# Patient Record
Sex: FEMALE | Race: WHITE | NOT HISPANIC OR LATINO
[De-identification: names, ages, dates, MRNs, and addresses within clinical notes are randomized per-mention and may not be internally consistent; named-entity substitution may affect disease eponyms.]

---

## 2017-12-19 ENCOUNTER — APPOINTMENT (OUTPATIENT)
Dept: DERMATOLOGY | Facility: CLINIC | Age: 52
End: 2017-12-19

## 2018-07-27 ENCOUNTER — APPOINTMENT (OUTPATIENT)
Dept: DERMATOLOGY | Facility: CLINIC | Age: 53
End: 2018-07-27
Payer: COMMERCIAL

## 2018-07-27 PROCEDURE — 99203 OFFICE O/P NEW LOW 30 MIN: CPT

## 2019-10-24 ENCOUNTER — RESULT REVIEW (OUTPATIENT)
Age: 54
End: 2019-10-24

## 2019-10-25 ENCOUNTER — APPOINTMENT (OUTPATIENT)
Dept: DERMATOLOGY | Facility: CLINIC | Age: 54
End: 2019-10-25
Payer: COMMERCIAL

## 2019-10-25 PROCEDURE — 99214 OFFICE O/P EST MOD 30 MIN: CPT | Mod: 25

## 2019-10-25 PROCEDURE — 11102 TANGNTL BX SKIN SINGLE LES: CPT

## 2019-11-12 ENCOUNTER — APPOINTMENT (OUTPATIENT)
Dept: SURGICAL ONCOLOGY | Facility: CLINIC | Age: 54
End: 2019-11-12
Payer: COMMERCIAL

## 2019-11-12 VITALS
HEART RATE: 70 BPM | OXYGEN SATURATION: 100 % | HEIGHT: 65 IN | WEIGHT: 152 LBS | DIASTOLIC BLOOD PRESSURE: 80 MMHG | BODY MASS INDEX: 25.33 KG/M2 | SYSTOLIC BLOOD PRESSURE: 134 MMHG

## 2019-11-12 DIAGNOSIS — Z92.21 PERSONAL HISTORY OF ANTINEOPLASTIC CHEMOTHERAPY: ICD-10-CM

## 2019-11-12 DIAGNOSIS — Z87.898 PERSONAL HISTORY OF OTHER SPECIFIED CONDITIONS: ICD-10-CM

## 2019-11-12 DIAGNOSIS — Z86.39 PERSONAL HISTORY OF OTHER ENDOCRINE, NUTRITIONAL AND METABOLIC DISEASE: ICD-10-CM

## 2019-11-12 DIAGNOSIS — Z13.71 ENCOUNTER FOR NONPROCREATIVE SCREENING FOR GENETIC DISEASE CARRIER STATUS: ICD-10-CM

## 2019-11-12 DIAGNOSIS — Z87.01 PERSONAL HISTORY OF PNEUMONIA (RECURRENT): ICD-10-CM

## 2019-11-12 DIAGNOSIS — Z78.9 OTHER SPECIFIED HEALTH STATUS: ICD-10-CM

## 2019-11-12 DIAGNOSIS — Z85.6 PERSONAL HISTORY OF LEUKEMIA: ICD-10-CM

## 2019-11-12 DIAGNOSIS — Z94.84 STEM CELLS TRANSPLANT STATUS: ICD-10-CM

## 2019-11-12 DIAGNOSIS — Z80.3 FAMILY HISTORY OF MALIGNANT NEOPLASM OF BREAST: ICD-10-CM

## 2019-11-12 DIAGNOSIS — Z85.3 PERSONAL HISTORY OF MALIGNANT NEOPLASM OF BREAST: ICD-10-CM

## 2019-11-12 PROCEDURE — 99244 OFF/OP CNSLTJ NEW/EST MOD 40: CPT

## 2019-11-12 RX ORDER — FOLIC ACID 1 MG/1
1 TABLET ORAL
Refills: 0 | Status: ACTIVE | COMMUNITY

## 2019-11-12 RX ORDER — ROSUVASTATIN CALCIUM 5 MG/1
5 TABLET, FILM COATED ORAL
Refills: 0 | Status: ACTIVE | COMMUNITY

## 2019-11-12 RX ORDER — LETROZOLE TABLETS 2.5 MG/1
2.5 TABLET, FILM COATED ORAL
Refills: 0 | Status: ACTIVE | COMMUNITY

## 2019-11-12 RX ORDER — ACYCLOVIR 400 MG/1
400 TABLET ORAL
Refills: 0 | Status: ACTIVE | COMMUNITY

## 2019-11-12 RX ORDER — LEVOTHYROXINE SODIUM 100 MCG
100 TABLET ORAL
Refills: 0 | Status: ACTIVE | COMMUNITY

## 2019-11-13 NOTE — REVIEW OF SYSTEMS
[Patient Intake Form Reviewed] : Patient intake form was reviewed [Negative] : Heme/Lymph [de-identified] : see HPI

## 2019-11-13 NOTE — PHYSICAL EXAM
[Normal] : supple, no neck mass and thyroid not enlarged [Normal Supraclavicular Lymph Nodes] : normal supraclavicular lymph nodes [Normal Neck Lymph Nodes] : normal neck lymph nodes  [Normal Axillary Lymph Nodes] : normal axillary lymph nodes [Normal] : grossly intact [de-identified] : healing right upper arm shave biopsy site

## 2019-11-13 NOTE — CONSULT LETTER
[Consult Letter:] : I had the pleasure of evaluating your patient, [unfilled]. [Dear  ___] : Dear  [unfilled], [Consult Closing:] : Thank you very much for allowing me to participate in the care of this patient.  If you have any questions, please do not hesitate to contact me. [Please see my note below.] : Please see my note below. [Sincerely,] : Sincerely, [FreeTextEntry3] : Pa Funk MD, MPH, FACS\par Surgical Oncology\par [facility] [place]\par Associate Professor of Surgery\par Department of General Surgery\par [employer] [facility]  [DrJohanny  ___] : Dr. ORTIZ

## 2019-11-13 NOTE — ASSESSMENT
[FreeTextEntry1] : IMP:\par Right posterior shoulder/upper arm shave biopsy -  INVASIVE MELANOMA, superficial spreading type, Breslow thickness measuring approximately 0.3 mm, non ulcerated, no regression, mitotic index <1/mm2.  Tumor stage: pT1a\par \par PLAN:\par Wide local excision of right upper arm melanoma\par We discussed the risks, benefits, and alternatives of the procedure with the patient, she expressed understanding and agree to proceed.

## 2019-11-13 NOTE — HISTORY OF PRESENT ILLNESS
[de-identified] : Ms. AFSHAN SUAREZ  is a 54 year  old female  presenting for an initial consultation for newly diagnosed melanoma, referred by Dr. Vidhya Cooper.  \par \par She underwent a routine dermatological exam on 10/25/19 and was found to have a suspicious mole on her right upper arm/posterior shoulder.  \par \par Right posterior shoulder/upper arm shave biopsy -  INVASIVE MELANOMA, superficial spreading type, Breslow thickness measuring approximately 0.3 mm, non ulcerated, no regression, mitotic index <1/mm2.  Tumor stage: pT1a\par \par Ms. SUAREZ reports a longstanding history of sun exposure without the use of sunblock. History of sunburns.  Denies any other concerning lesions or masses. Denies bleeding or pruritic moles. Denies pain or constitutional symptoms.\par \par Her history is significant for right breast cancer s/p mastectomy in 2015 (on Letrozole, BRCA Negative), AML 2017 s/p stem cell transplant in 1/2018, Pneumonia, Bronchoscopy, Hypothyroidism, HLD.    No history of strokes, MI, DM or bleeding disorders. Does not take blood thinners. \par \par Family history includes paternal aunt and paternal cousin with breast cancer.

## 2019-12-06 ENCOUNTER — APPOINTMENT (OUTPATIENT)
Dept: SURGICAL ONCOLOGY | Facility: CLINIC | Age: 54
End: 2019-12-06
Payer: COMMERCIAL

## 2019-12-06 VITALS
WEIGHT: 154 LBS | SYSTOLIC BLOOD PRESSURE: 100 MMHG | BODY MASS INDEX: 25.66 KG/M2 | TEMPERATURE: 98.3 F | HEART RATE: 80 BPM | DIASTOLIC BLOOD PRESSURE: 70 MMHG | OXYGEN SATURATION: 99 % | HEIGHT: 65 IN

## 2019-12-06 PROCEDURE — 14000 TIS TRNFR TRUNK 10 SQ CM/<: CPT

## 2019-12-06 NOTE — PROCEDURE
[Excision Of Lesion] : excision of lesion [Patient] : patient [Risks] : risks [Benefits] : benefits [Alternatives] : alternatives [___ ml Inj] : [unfilled] ~Uml [1%] : 1% [With Epi] : with epinephrine [] : right upper arm [Jacqui] : using Jacqui [Excisional: Margin ___mm] : the lesion was excised with a  [unfilled] ~Umm margin in an elliptical fashion [Cautery] : cautery [Vicryl] : Vicryl suture(s) [___ # of Sutures] : [unfilled] [Size: ___-0] : [unfilled]-0 [Interrupted] : interrupted [Nylon] : nylon suture(s) [Sent to Pathology] : the excised lesion was place in buffered formalin and sent for pathology [Tolerated Well] : the patient tolerated the procedure well [No Complications] : there were no complications [___ Week(s)] : in [unfilled] week(s) [FreeTextEntry5] : right upper posterior arm 2.7 cm excision maximal diameter [de-identified] : right upper posterior arm melanoma - short single superior, short double distal [de-identified] : 2.7 cm x 4 cm defect closed primarily - skin flaps were raised in order to faciliate a tension free closure of the dermis and epidermis

## 2019-12-07 ENCOUNTER — RESULT REVIEW (OUTPATIENT)
Age: 54
End: 2019-12-07

## 2019-12-17 ENCOUNTER — APPOINTMENT (OUTPATIENT)
Dept: SURGICAL ONCOLOGY | Facility: CLINIC | Age: 54
End: 2019-12-17
Payer: COMMERCIAL

## 2019-12-17 VITALS
OXYGEN SATURATION: 97 % | SYSTOLIC BLOOD PRESSURE: 132 MMHG | HEART RATE: 72 BPM | DIASTOLIC BLOOD PRESSURE: 74 MMHG | BODY MASS INDEX: 24.99 KG/M2 | TEMPERATURE: 98.1 F | HEIGHT: 65 IN | WEIGHT: 150 LBS

## 2019-12-17 PROCEDURE — 99024 POSTOP FOLLOW-UP VISIT: CPT

## 2019-12-17 NOTE — CONSULT LETTER
[Dear  ___] : Dear  [unfilled], [Consult Letter:] : I had the pleasure of evaluating your patient, [unfilled]. [Please see my note below.] : Please see my note below. [Consult Closing:] : Thank you very much for allowing me to participate in the care of this patient.  If you have any questions, please do not hesitate to contact me. [Sincerely,] : Sincerely, [FreeTextEntry3] : Pa Funk MD, MPH, FACS\par Surgical Oncology\par [facility] [place]\par Associate Professor of Surgery\par Department of General Surgery\par [employer] at [facility]  [DrJohanny  ___] : Dr. ORTIZ

## 2019-12-17 NOTE — ASSESSMENT
[FreeTextEntry1] : IMP:\par Right posterior shoulder/upper arm shave biopsy -  INVASIVE MELANOMA, superficial spreading type, Breslow thickness measuring approximately 0.3 mm, non ulcerated, no regression, mitotic index <1/mm2.  Tumor stage: pT1a\par ****SURGERY 12/6/19-  S/p WLE of right upper arm T1 (0.3 mm) melanoma. \par ****FINAL PATHOLOGY revealed melanoma in situ, negative margins. \par No evidence of infection \par \par PLAN:\par RTO 3 months\par Continue f/u with dermatology

## 2019-12-17 NOTE — PHYSICAL EXAM
[Normal] : well developed, well nourished, in no acute distress [de-identified] : healing right upper posterior arm incision with no evidence of infection; sutures removed

## 2019-12-17 NOTE — HISTORY OF PRESENT ILLNESS
[de-identified] : Ms. AFSHAN SUAREZ  is a 54 year  old female  presenting for an initial post op visit. \par Initially consulted 11/12/19 for newly diagnosed melanoma, referred by Dr. Vidhya Cooper.  \par \par She underwent a routine dermatological exam on 10/25/19 and was found to have a suspicious mole on her right upper arm/posterior shoulder.  \par \par Right posterior shoulder/upper arm shave biopsy -  INVASIVE MELANOMA, superficial spreading type, Breslow thickness measuring approximately 0.3 mm, non ulcerated, no regression, mitotic index <1/mm2.  Tumor stage: pT1a\par \par Ms. SUAREZ reports a longstanding history of sun exposure without the use of sunblock. History of sunburns.  Denies any other concerning lesions or masses. Denies bleeding or pruritic moles. Denies pain or constitutional symptoms.\par \par Her history is significant for right breast cancer s/p mastectomy in 2015 (on Letrozole, BRCA Negative), AML 2017 s/p stem cell transplant in 1/2018, Pneumonia, Bronchoscopy, Hypothyroidism, HLD.    No history of strokes, MI, DM or bleeding disorders. Does not take blood thinners. \par \par Family history includes paternal aunt and paternal cousin with breast cancer. \par \par INTERVAL HISTORY:\par ****SURGERY 12/6/19-  S/p WLE of right upper arm T1 (0.3 mm) melanoma. \par ****FINAL PATHOLOGY revealed melanoma in situ, negative margins. \par \par 12/17/19- Denies pain, fever or chills.

## 2020-04-02 ENCOUNTER — APPOINTMENT (OUTPATIENT)
Dept: SURGICAL ONCOLOGY | Facility: CLINIC | Age: 55
End: 2020-04-02

## 2020-04-29 ENCOUNTER — APPOINTMENT (OUTPATIENT)
Dept: DERMATOLOGY | Facility: CLINIC | Age: 55
End: 2020-04-29

## 2020-07-29 NOTE — ASSESSMENT
[FreeTextEntry1] : IMP:\par Right posterior shoulder/upper arm shave biopsy -  INVASIVE MELANOMA, superficial spreading type, Breslow thickness measuring approximately 0.3 mm, non ulcerated, no regression, mitotic index <1/mm2.  Tumor stage: pT1a\par ****SURGERY 12/6/19-  S/p WLE of right upper arm T1 (0.3 mm) melanoma. \par ****FINAL PATHOLOGY revealed melanoma in situ, negative margins. \par \par 7/30/202 - \par \par PLAN:\par RTO \par Continue f/u with dermatology

## 2020-07-29 NOTE — HISTORY OF PRESENT ILLNESS
[de-identified] : Ms. Zoe Brush  is a 54 year  old female  presenting for a follow up exam\par Initially consulted 11/12/19 for newly diagnosed melanoma, referred by Dr. Vidhya Cooper.  \par \par She underwent a routine dermatological exam on 10/25/19 and was found to have a suspicious mole on her right upper arm/posterior shoulder.  \par \par Right posterior shoulder/upper arm shave biopsy -  INVASIVE MELANOMA, superficial spreading type, Breslow thickness measuring approximately 0.3 mm, non ulcerated, no regression, mitotic index <1/mm2.  Tumor stage: pT1a\par \par Ms. Brush reports a longstanding history of sun exposure without the use of sunblock. History of sunburns.  Denies any other concerning lesions or masses. Denies bleeding or pruritic moles. Denies pain or constitutional symptoms.\par \par Her history is significant for right breast cancer s/p mastectomy in 2015 (on Letrozole, BRCA Negative), AML 2017 s/p stem cell transplant in 1/2018, Pneumonia, Bronchoscopy, Hypothyroidism, HLD.    No history of strokes, MI, DM or bleeding disorders. Does not take blood thinners. \par \par Family history includes paternal aunt and paternal cousin with breast cancer. \par \par INTERVAL HISTORY:\par ****SURGERY 12/6/19-  S/p WLE of right upper arm T1 (0.3 mm) melanoma. \par ****FINAL PATHOLOGY revealed melanoma in situ, negative margins. \par \par 12/17/19- Denies pain, fever or chills. \par \par 7/30/2020 -

## 2020-07-29 NOTE — REASON FOR VISIT
[Follow-Up Visit] : a follow-up visit for [Melanoma] : melanoma [FreeTextEntry2] : s/p WLE on 12/6/19

## 2020-07-30 ENCOUNTER — APPOINTMENT (OUTPATIENT)
Dept: SURGICAL ONCOLOGY | Facility: CLINIC | Age: 55
End: 2020-07-30
Payer: COMMERCIAL

## 2020-07-30 VITALS
OXYGEN SATURATION: 98 % | HEIGHT: 65 IN | WEIGHT: 161 LBS | DIASTOLIC BLOOD PRESSURE: 83 MMHG | SYSTOLIC BLOOD PRESSURE: 144 MMHG | HEART RATE: 68 BPM | TEMPERATURE: 98.1 F | BODY MASS INDEX: 26.82 KG/M2 | RESPIRATION RATE: 16 BRPM

## 2020-07-30 PROCEDURE — 99214 OFFICE O/P EST MOD 30 MIN: CPT

## 2020-11-04 ENCOUNTER — APPOINTMENT (OUTPATIENT)
Dept: DERMATOLOGY | Facility: CLINIC | Age: 55
End: 2020-11-04
Payer: COMMERCIAL

## 2020-11-04 PROCEDURE — 99072 ADDL SUPL MATRL&STAF TM PHE: CPT

## 2020-11-04 PROCEDURE — 99214 OFFICE O/P EST MOD 30 MIN: CPT

## 2021-01-07 ENCOUNTER — APPOINTMENT (OUTPATIENT)
Dept: SURGICAL ONCOLOGY | Facility: CLINIC | Age: 56
End: 2021-01-07
Payer: COMMERCIAL

## 2021-01-07 PROCEDURE — 99072 ADDL SUPL MATRL&STAF TM PHE: CPT

## 2021-01-07 PROCEDURE — 99214 OFFICE O/P EST MOD 30 MIN: CPT

## 2021-01-07 NOTE — CONSULT LETTER
[Dear  ___] : Dear  [unfilled], [Consult Letter:] : I had the pleasure of evaluating your patient, [unfilled]. [Please see my note below.] : Please see my note below. [Consult Closing:] : Thank you very much for allowing me to participate in the care of this patient.  If you have any questions, please do not hesitate to contact me. [Sincerely,] : Sincerely, [FreeTextEntry3] : Pa Funk MD, MPH, FACS\par Surgical Oncology\par [facility] [place]\par Associate Professor of Surgery\par Department of General Surgery\par [employer] at [facility] [place]  [DrJohanny  ___] : Dr. ORTIZ

## 2021-01-07 NOTE — ASSESSMENT
[FreeTextEntry1] : IMP:\par Right posterior shoulder/upper arm shave biopsy -  INVASIVE MELANOMA, superficial spreading type, Breslow thickness measuring approximately 0.3 mm, non ulcerated, no regression, mitotic index <1/mm2.  Tumor stage: pT1a\par ****SURGERY 12/6/19-  S/p WLE of right upper arm T1 (0.3 mm) melanoma. \par ****FINAL PATHOLOGY revealed melanoma in situ, negative margins. \par No evidence of recurrence \par \par PLAN:\par RTO 6 months \par Continue f/u with dermatology

## 2021-01-07 NOTE — HISTORY OF PRESENT ILLNESS
[de-identified] : Ms. Zoe Brush  is a 55 year  old female  presenting for a follow up exam\par Initially consulted 11/12/19 for newly diagnosed melanoma, referred by Dr. Vidhya Cooper.  \par \par She underwent a routine dermatological exam on 10/25/19 and was found to have a suspicious mole on her right upper arm/posterior shoulder.  \par \par Right posterior shoulder/upper arm shave biopsy -  INVASIVE MELANOMA, superficial spreading type, Breslow thickness measuring approximately 0.3 mm, non ulcerated, no regression, mitotic index <1/mm2.  Tumor stage: pT1a\par \par Ms. Brush reports a longstanding history of sun exposure without the use of sunblock. History of sunburns.  Denies any other concerning lesions or masses. Denies bleeding or pruritic moles. Denies pain or constitutional symptoms.\par \par Her history is significant for right breast cancer s/p mastectomy in 2015 (on Letrozole, BRCA Negative), AML 2017 s/p stem cell transplant in 1/2018, Pneumonia, Bronchoscopy, Hypothyroidism, HLD.    No history of strokes, MI, DM or bleeding disorders. Does not take blood thinners. \par \par Family history includes paternal aunt and paternal cousin with breast cancer. \par \par INTERVAL HISTORY:\par ****SURGERY 12/6/19-  S/p WLE of right upper arm T1 (0.3 mm) melanoma. \par ****FINAL PATHOLOGY revealed melanoma in situ, negative margins. \par \par 12/17/19- Denies pain, fever or chills. \par \par 7/30/2020 - No new events or biopsies.\par \par 1/7/2021- Continues f/u with Dr. Cooper with no new biopsies (last visit 11/2020).  Denies new skin lesions or masses. No bleeding or pruritic moles. Doing well.  Patient was exposed to COVID in 12/2020 with no symptoms.  She quarantined for 14 days and returned to work 12/29/2020.

## 2021-01-07 NOTE — PHYSICAL EXAM
[Normal] : supple, no neck mass and thyroid not enlarged [Normal Axillary Lymph Nodes] : normal axillary lymph nodes [Normal] : oriented to person, place and time, with appropriate affect [de-identified] : healed hypertrophic right upper posterior arm with no evidence of recurrence

## 2021-05-05 ENCOUNTER — APPOINTMENT (OUTPATIENT)
Dept: DERMATOLOGY | Facility: CLINIC | Age: 56
End: 2021-05-05
Payer: COMMERCIAL

## 2021-05-05 ENCOUNTER — RESULT REVIEW (OUTPATIENT)
Age: 56
End: 2021-05-05

## 2021-05-05 PROCEDURE — 99072 ADDL SUPL MATRL&STAF TM PHE: CPT

## 2021-05-05 PROCEDURE — 17280 DSTR MAL LS F/E/E/N/L/M .5/<: CPT

## 2021-05-05 PROCEDURE — 99213 OFFICE O/P EST LOW 20 MIN: CPT | Mod: 25

## 2021-07-01 PROBLEM — D09.9 SQUAMOUS CELL CARCINOMA IN SITU: Status: ACTIVE | Noted: 2021-07-01

## 2021-07-08 ENCOUNTER — APPOINTMENT (OUTPATIENT)
Dept: SURGICAL ONCOLOGY | Facility: CLINIC | Age: 56
End: 2021-07-08
Payer: COMMERCIAL

## 2021-07-08 VITALS
HEART RATE: 81 BPM | HEIGHT: 65 IN | DIASTOLIC BLOOD PRESSURE: 73 MMHG | OXYGEN SATURATION: 97 % | SYSTOLIC BLOOD PRESSURE: 124 MMHG | BODY MASS INDEX: 28.5 KG/M2 | TEMPERATURE: 97.7 F | WEIGHT: 171.03 LBS

## 2021-07-08 DIAGNOSIS — D09.9 CARCINOMA IN SITU, UNSPECIFIED: ICD-10-CM

## 2021-07-08 PROCEDURE — 99213 OFFICE O/P EST LOW 20 MIN: CPT

## 2021-10-01 NOTE — CONSULT LETTER
[Dear  ___] : Dear  [unfilled], [Consult Letter:] : I had the pleasure of evaluating your patient, [unfilled]. [Please see my note below.] : Please see my note below. [Consult Closing:] : Thank you very much for allowing me to participate in the care of this patient.  If you have any questions, please do not hesitate to contact me. [Sincerely,] : Sincerely, [FreeTextEntry3] : Pa Funk MD, MPH, FACS\par Surgical Oncology\par [facility] [place]\par Associate Professor of Surgery\par Department of General Surgery\par [employer] at [facility]  [DrJohanny  ___] : Dr. ORTIZ

## 2021-10-01 NOTE — ASSESSMENT
[FreeTextEntry1] : IMP:\par Right posterior shoulder/upper arm shave biopsy -  INVASIVE MELANOMA, superficial spreading type, Breslow thickness measuring approximately 0.3 mm, non ulcerated, no regression, mitotic index <1/mm2.  Tumor stage: pT1a\par ****SURGERY 12/6/19-  S/p WLE of right upper arm T1 (0.3 mm) melanoma. \par ****FINAL PATHOLOGY revealed melanoma in situ, negative margins. \par No evidence of recurrence \par 7/8/21: Recent shave biopsy of the right central forehead 5/5/21 revealed diagnosis of squamous cell carcinoma in situ, extending to base of biopsy. \par \par PLAN:\par RTO 6 months \par Continue f/u with dermatology

## 2021-10-01 NOTE — PHYSICAL EXAM
[Normal] : well developed, well nourished, in no acute distress [de-identified] : healing right upper posterior arm incision with no evidence of infection; sutures removed

## 2021-10-01 NOTE — HISTORY OF PRESENT ILLNESS
[de-identified] : Ms. Zoe Brush  is a 55 year  old female  presenting for a follow up exam\par Initially consulted 11/12/19 for newly diagnosed melanoma, referred by Dr. Vidhya Cooper.  \par \par She underwent a routine dermatological exam on 10/25/19 and was found to have a suspicious mole on her right upper arm/posterior shoulder.  \par \par Right posterior shoulder/upper arm shave biopsy -  INVASIVE MELANOMA, superficial spreading type, Breslow thickness measuring approximately 0.3 mm, non ulcerated, no regression, mitotic index <1/mm2.  Tumor stage: pT1a\par \par Ms. Brush reports a longstanding history of sun exposure without the use of sunblock. History of sunburns.  Denies any other concerning lesions or masses. Denies bleeding or pruritic moles. Denies pain or constitutional symptoms.\par \par Her history is significant for right breast cancer s/p mastectomy in 2015 (on Letrozole, BRCA Negative), AML 2017 s/p stem cell transplant in 1/2018, Pneumonia, Bronchoscopy, Hypothyroidism, HLD.    No history of strokes, MI, DM or bleeding disorders. Does not take blood thinners. \par \par Family history includes paternal aunt and paternal cousin with breast cancer. \par \par INTERVAL HISTORY:\par ****SURGERY 12/6/19-  S/p WLE of right upper arm T1 (0.3 mm) melanoma. \par ****FINAL PATHOLOGY revealed melanoma in situ, negative margins. \par \par 12/17/19- Denies pain, fever or chills. \par \par 7/30/2020 - No new events or biopsies.\par \par 1/7/2021- Continues f/u with Dr. Cooper with no new biopsies (last visit 11/2020).  Denies new skin lesions or masses. No bleeding or pruritic moles. Doing well.  Patient was exposed to COVID in 12/2020 with no symptoms.  She quarantined for 14 days and returned to work 12/29/2020. \par \par 7/8/21: Recent shave biopsy of the right central forehead 5/5/21 revealed diagnosis of squamous cell carcinoma in situ, extending to base of biopsy.

## 2021-11-10 ENCOUNTER — APPOINTMENT (OUTPATIENT)
Dept: DERMATOLOGY | Facility: CLINIC | Age: 56
End: 2021-11-10
Payer: COMMERCIAL

## 2021-11-10 PROCEDURE — 17000 DESTRUCT PREMALG LESION: CPT

## 2021-11-10 PROCEDURE — 99213 OFFICE O/P EST LOW 20 MIN: CPT | Mod: 25

## 2022-01-27 ENCOUNTER — APPOINTMENT (OUTPATIENT)
Dept: SURGICAL ONCOLOGY | Facility: CLINIC | Age: 57
End: 2022-01-27
Payer: COMMERCIAL

## 2022-01-27 VITALS
HEART RATE: 79 BPM | RESPIRATION RATE: 16 BRPM | HEIGHT: 65 IN | WEIGHT: 171 LBS | SYSTOLIC BLOOD PRESSURE: 130 MMHG | OXYGEN SATURATION: 97 % | BODY MASS INDEX: 28.49 KG/M2 | DIASTOLIC BLOOD PRESSURE: 79 MMHG

## 2022-01-27 PROCEDURE — 99213 OFFICE O/P EST LOW 20 MIN: CPT

## 2022-02-02 NOTE — CONSULT LETTER
[Dear  ___] : Dear  [unfilled], [Consult Letter:] : I had the pleasure of evaluating your patient, [unfilled]. [Please see my note below.] : Please see my note below. [Consult Closing:] : Thank you very much for allowing me to participate in the care of this patient.  If you have any questions, please do not hesitate to contact me. [Sincerely,] : Sincerely, [FreeTextEntry3] : Pa Funk MD, MPH, FACS\par Surgical Oncology\par [facility] [place]\par Associate Professor of Surgery\par Department of General Surgery\par [employer] at [facility]  [DrJohanny  ___] : Dr. ORTIZ

## 2022-02-02 NOTE — PHYSICAL EXAM
[Normal] : supple, no neck mass and thyroid not enlarged [Normal Axillary Lymph Nodes] : normal axillary lymph nodes [Normal] : oriented to person, place and time, with appropriate affect [de-identified] : healed hypertrophic right upper posterior arm with no evidence of recurrence

## 2022-02-02 NOTE — HISTORY OF PRESENT ILLNESS
[de-identified] : Ms. Zoe Brush  is a 56 year  old female  presenting for a follow up exam\par Initially consulted 11/12/19 for newly diagnosed melanoma, referred by Dr. Vidhya Cooper.  \par \par She underwent a routine dermatological exam on 10/25/19 and was found to have a suspicious mole on her right upper arm/posterior shoulder.  \par \par Right posterior shoulder/upper arm shave biopsy -  INVASIVE MELANOMA, superficial spreading type, Breslow thickness measuring approximately 0.3 mm, non ulcerated, no regression, mitotic index <1/mm2.  Tumor stage: pT1a\par \par Ms. Brush reports a longstanding history of sun exposure without the use of sunblock. History of sunburns.  Denies any other concerning lesions or masses. Denies bleeding or pruritic moles. Denies pain or constitutional symptoms.\par \par Her history is significant for right breast cancer s/p mastectomy in 2015 (on Letrozole, BRCA Negative), AML 2017 s/p stem cell transplant in 1/2018, Pneumonia, Bronchoscopy, Hypothyroidism, HLD.    No history of strokes, MI, DM or bleeding disorders. Does not take blood thinners. \par \par Family history includes paternal aunt and paternal cousin with breast cancer. \par \par INTERVAL HISTORY:\par ****SURGERY 12/6/19-  S/p WLE of right upper arm T1 (0.3 mm) melanoma. \par ****FINAL PATHOLOGY revealed melanoma in situ, negative margins. \par \par 12/17/19- Denies pain, fever or chills. \par \par 7/30/2020 - No new events or biopsies.\par \par 1/7/2021- Continues f/u with Dr. Cooper with no new biopsies (last visit 11/2020).  Denies new skin lesions or masses. No bleeding or pruritic moles. Doing well.  Patient was exposed to COVID in 12/2020 with no symptoms.  She quarantined for 14 days and returned to work 12/29/2020. \par \par 7/8/21: Recent shave biopsy of the right central forehead 5/5/21 revealed diagnosis of squamous cell carcinoma in situ, extending to base of biopsy. \par \par 1/27/2022- Pt. was evaluated by Dr. Cooper in 11/2021 with no new biopsies. Doing well with no complaints.

## 2022-02-02 NOTE — ASSESSMENT
[FreeTextEntry1] : IMP:\par Right posterior shoulder/upper arm shave biopsy -  INVASIVE MELANOMA, superficial spreading type, Breslow thickness measuring approximately 0.3 mm, non ulcerated, no regression, mitotic index <1/mm2.  Tumor stage: pT1a\par ****SURGERY 12/6/19-  S/p WLE of right upper arm T1 (0.3 mm) melanoma. \par ****FINAL PATHOLOGY revealed melanoma in situ, negative margins. \par No evidence of recurrence \par \par Recent shave biopsy of the right central forehead 5/5/21 revealed diagnosis of squamous cell carcinoma in situ, extending to base of biopsy. \par \par PLAN:\par RTO 6 months \par Continue f/u with dermatology

## 2022-05-11 ENCOUNTER — APPOINTMENT (OUTPATIENT)
Dept: DERMATOLOGY | Facility: CLINIC | Age: 57
End: 2022-05-11
Payer: COMMERCIAL

## 2022-05-11 PROCEDURE — 17000 DESTRUCT PREMALG LESION: CPT

## 2022-05-11 PROCEDURE — 17003 DESTRUCT PREMALG LES 2-14: CPT

## 2022-05-11 PROCEDURE — 99213 OFFICE O/P EST LOW 20 MIN: CPT | Mod: 25

## 2022-11-30 ENCOUNTER — APPOINTMENT (OUTPATIENT)
Dept: DERMATOLOGY | Facility: CLINIC | Age: 57
End: 2022-11-30

## 2022-11-30 PROCEDURE — 99213 OFFICE O/P EST LOW 20 MIN: CPT | Mod: 25

## 2022-11-30 PROCEDURE — 17000 DESTRUCT PREMALG LESION: CPT

## 2023-01-11 PROBLEM — C43.61 MALIGNANT MELANOMA OF RIGHT UPPER ARM: Status: ACTIVE | Noted: 2019-11-12

## 2023-01-12 ENCOUNTER — APPOINTMENT (OUTPATIENT)
Dept: SURGICAL ONCOLOGY | Facility: CLINIC | Age: 58
End: 2023-01-12
Payer: COMMERCIAL

## 2023-01-12 VITALS
RESPIRATION RATE: 16 BRPM | OXYGEN SATURATION: 96 % | HEART RATE: 64 BPM | TEMPERATURE: 97.4 F | BODY MASS INDEX: 26.33 KG/M2 | WEIGHT: 158 LBS | HEIGHT: 65 IN | SYSTOLIC BLOOD PRESSURE: 146 MMHG | DIASTOLIC BLOOD PRESSURE: 86 MMHG

## 2023-01-12 DIAGNOSIS — C43.61 MALIGNANT MELANOMA OF RIGHT UPPER LIMB, INCLUDING SHOULDER: ICD-10-CM

## 2023-01-12 PROCEDURE — 99213 OFFICE O/P EST LOW 20 MIN: CPT

## 2023-01-12 NOTE — CONSULT LETTER
[Dear  ___] : Dear  [unfilled], [Consult Letter:] : I had the pleasure of evaluating your patient, [unfilled]. [Please see my note below.] : Please see my note below. [Consult Closing:] : Thank you very much for allowing me to participate in the care of this patient.  If you have any questions, please do not hesitate to contact me. [Sincerely,] : Sincerely, [DrJohanny  ___] : Dr. ORTIZ [FreeTextEntry3] : Pa Funk MD, MPH, FACS\par Surgical Oncology\par [facility] [place]\par Associate Professor of Surgery\par Department of General Surgery\par [employer] at [facility]

## 2023-01-12 NOTE — ASSESSMENT
[FreeTextEntry1] : IMP:\par Right posterior shoulder/upper arm shave biopsy -  INVASIVE MELANOMA, superficial spreading type, Breslow thickness measuring approximately 0.3 mm, non ulcerated, no regression, mitotic index <1/mm2.  Tumor stage: pT1a\par ****SURGERY 12/6/19-  S/p WLE of right upper arm T1 (0.3 mm) melanoma. \par ****FINAL PATHOLOGY revealed melanoma in situ, negative margins. \par No evidence of recurrence \par \par Recent shave biopsy of the right central forehead 5/5/21 revealed diagnosis of squamous cell carcinoma in situ, extending to base of biopsy. \par \par PLAN:\par 1) RTO 1 year\par 2) Continue f/u with dermatology

## 2023-01-12 NOTE — PHYSICAL EXAM
[Normal] : supple, no neck mass and thyroid not enlarged [Normal Axillary Lymph Nodes] : normal axillary lymph nodes [Normal] : oriented to person, place and time, with appropriate affect [Normal Supraclavicular Lymph Nodes] : normal supraclavicular lymph nodes [de-identified] : healed hypertrophic right upper posterior arm with no evidence of recurrence

## 2023-01-12 NOTE — HISTORY OF PRESENT ILLNESS
[de-identified] : Ms. Zoe Brush  is a 56 year  old female  presenting for a follow up exam\par Initially consulted 11/12/19 for newly diagnosed melanoma, referred by Dr. Vidhya Cooper.\par \par She underwent a routine dermatological exam on 10/25/19 and was found to have a suspicious mole on her right upper arm/posterior shoulder.  \par \par Right posterior shoulder/upper arm shave biopsy -  INVASIVE MELANOMA, superficial spreading type, Breslow thickness measuring approximately 0.3 mm, non ulcerated, no regression, mitotic index <1/mm2.  Tumor stage: pT1a\par \par Ms. Brush reports a longstanding history of sun exposure without the use of sunblock. History of sunburns.  Denies any other concerning lesions or masses. Denies bleeding or pruritic moles. Denies pain or constitutional symptoms.\par \par Her history is significant for right breast cancer s/p mastectomy in 2015 (on Letrozole, BRCA Negative), AML 2017 s/p stem cell transplant in 1/2018, Pneumonia, Bronchoscopy, Hypothyroidism, HLD.    No history of strokes, MI, DM or bleeding disorders. Does not take blood thinners. \par \par Family history includes paternal aunt and paternal cousin with breast cancer. \par \par INTERVAL HISTORY:\par ****SURGERY 12/6/19-  S/p WLE of right upper arm T1 (0.3 mm) melanoma. \par ****FINAL PATHOLOGY revealed melanoma in situ, negative margins. \par \par 12/17/19- Denies pain, fever or chills. \par \par 7/30/2020 - No new events or biopsies.\par \par 1/7/2021- Continues f/u with Dr. Cooper with no new biopsies (last visit 11/2020).  Denies new skin lesions or masses. No bleeding or pruritic moles. Doing well.  Patient was exposed to COVID in 12/2020 with no symptoms.  She quarantined for 14 days and returned to work 12/29/2020. \par \par 7/8/21: Recent shave biopsy of the right central forehead 5/5/21 revealed diagnosis of squamous cell carcinoma in situ, extending to base of biopsy. \par \par 1/27/2022- Pt. was evaluated by Dr. Cooper in 11/2021 with no new biopsies. Doing well with no complaints. \par \par 1/12/22- Denies any concerning lesions or masses. Denies bleeding or pruritic moles. Denies pain or constitutional changes. Last seen Dr. Vidhya Cooper Nov 2022 with no new biopsies taken.

## 2023-06-07 ENCOUNTER — APPOINTMENT (OUTPATIENT)
Dept: DERMATOLOGY | Facility: CLINIC | Age: 58
End: 2023-06-07
Payer: COMMERCIAL

## 2023-06-07 PROCEDURE — 99213 OFFICE O/P EST LOW 20 MIN: CPT

## 2024-01-11 ENCOUNTER — APPOINTMENT (OUTPATIENT)
Dept: SURGICAL ONCOLOGY | Facility: CLINIC | Age: 59
End: 2024-01-11
Payer: COMMERCIAL

## 2024-01-11 VITALS
DIASTOLIC BLOOD PRESSURE: 84 MMHG | HEIGHT: 65 IN | SYSTOLIC BLOOD PRESSURE: 138 MMHG | HEART RATE: 71 BPM | OXYGEN SATURATION: 96 % | WEIGHT: 162.48 LBS | BODY MASS INDEX: 27.07 KG/M2

## 2024-01-11 DIAGNOSIS — Z08 ENCOUNTER FOR FOLLOW-UP EXAMINATION AFTER COMPLETED TREATMENT FOR MALIGNANT NEOPLASM: ICD-10-CM

## 2024-01-11 DIAGNOSIS — Z09 ENCOUNTER FOR FOLLOW-UP EXAMINATION AFTER COMPLETED TREATMENT FOR CONDITIONS OTHER THAN MALIGNANT NEOPLASM: ICD-10-CM

## 2024-01-11 DIAGNOSIS — Z85.820 ENCOUNTER FOR FOLLOW-UP EXAMINATION AFTER COMPLETED TREATMENT FOR MALIGNANT NEOPLASM: ICD-10-CM

## 2024-01-11 PROCEDURE — 99024 POSTOP FOLLOW-UP VISIT: CPT

## 2024-01-13 PROBLEM — Z08 FOLLOW-UP SURVEILLANCE OF MELANOMA, ENCOUNTER FOR: Status: ACTIVE | Noted: 2024-01-13

## 2024-01-13 PROBLEM — Z09 SURGICAL FOLLOW-UP CARE: Status: ACTIVE | Noted: 2024-01-13

## 2024-01-13 NOTE — CONSULT LETTER
[Dear  ___] : Dear  [unfilled], [Consult Letter:] : I had the pleasure of evaluating your patient, [unfilled]. [Please see my note below.] : Please see my note below. [Consult Closing:] : Thank you very much for allowing me to participate in the care of this patient.  If you have any questions, please do not hesitate to contact me. [Sincerely,] : Sincerely, [DrJohanny  ___] : Dr. ORTIZ [FreeTextEntry3] : Pa Funk MD, MPH, FACS\par  Surgical Oncology\par  [facility] [place]\par  Associate Professor of Surgery\par  Department of General Surgery\par  [employer] at [facility]

## 2024-01-13 NOTE — HISTORY OF PRESENT ILLNESS
[de-identified] : Ms. Zoe Brush  is a 56 year  old female  presenting for a follow up exam Initially consulted 11/12/19 for newly diagnosed melanoma, referred by Dr. Vidhya Cooper.  She underwent a routine dermatological exam on 10/25/19 and was found to have a suspicious mole on her right upper arm/posterior shoulder.    Right posterior shoulder/upper arm shave biopsy -  INVASIVE MELANOMA, superficial spreading type, Breslow thickness measuring approximately 0.3 mm, non ulcerated, no regression, mitotic index <1/mm2.  Tumor stage: pT1a  Ms. Brush reports a longstanding history of sun exposure without the use of sunblock. History of sunburns.  Denies any other concerning lesions or masses. Denies bleeding or pruritic moles. Denies pain or constitutional symptoms.  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    No history of strokes, MI, DM or bleeding disorders. Does not take blood thinners.   Family history includes paternal aunt and paternal cousin with breast cancer.   INTERVAL HISTORY: ****SURGERY 12/6/19-  S/p WLE of right upper arm T1 (0.3 mm) melanoma.  ****FINAL PATHOLOGY revealed melanoma in situ, negative margins.   12/17/19- Denies pain, fever or chills.   7/30/2020 - No new events or biopsies.  1/7/2021- Continues f/u with Dr. Cooper with no new biopsies (last visit 11/2020).  Denies new skin lesions or masses. No bleeding or pruritic moles. Doing well.  Patient was exposed to COVID in 12/2020 with no symptoms.  She quarantined for 14 days and returned to work 12/29/2020.   7/8/21: Recent shave biopsy of the right central forehead 5/5/21 revealed diagnosis of squamous cell carcinoma in situ, extending to base of biopsy.   1/27/2022- Pt. was evaluated by Dr. Cooper in 11/2021 with no new biopsies. Doing well with no complaints.   1/12/23- Denies any concerning lesions or masses. Denies bleeding or pruritic moles. Denies pain or constitutional changes. Last seen Dr. Vidhya Cooper Nov 2022 with no new biopsies taken.   01/11/24- Denies any concerning lesions or masses. Denies bleeding or pruritic moles. Denies pain or constitutional changes. She sees Dr. Cooper Mar 6th,24 for FU DERM surveillance.

## 2024-01-13 NOTE — PHYSICAL EXAM
[Normal] : supple, no neck mass and thyroid not enlarged [Normal Supraclavicular Lymph Nodes] : normal supraclavicular lymph nodes [Normal Axillary Lymph Nodes] : normal axillary lymph nodes [Normal] : oriented to person, place and time, with appropriate affect [de-identified] : healed hypertrophic right upper posterior arm with no evidence of recurrence

## 2024-01-13 NOTE — ASSESSMENT
[FreeTextEntry1] : Malignant melanoma of right upper arm (172.6) (C43.61) IMP:  Right posterior shoulder/upper arm shave biopsy - INVASIVE MELANOMA, superficial spreading type, Breslow thickness measuring approximately 0.3 mm, non ulcerated, no regression, mitotic index <1/mm2. Tumor stage: pT1a  ****SURGERY 12/6/19- S/p WLE of right upper arm T1 (0.3 mm) melanoma.  ****FINAL PATHOLOGY revealed melanoma in situ, negative margins.  No evidence of recurrence    PLAN:  1) RTO 1 year  2) Continue f/u with dermatology- Mar 6th w/ Dr. Cooper

## 2024-03-13 ENCOUNTER — APPOINTMENT (OUTPATIENT)
Dept: DERMATOLOGY | Facility: CLINIC | Age: 59
End: 2024-03-13
Payer: COMMERCIAL

## 2024-03-13 PROCEDURE — 99213 OFFICE O/P EST LOW 20 MIN: CPT

## 2024-12-18 ENCOUNTER — APPOINTMENT (OUTPATIENT)
Dept: DERMATOLOGY | Facility: CLINIC | Age: 59
End: 2024-12-18
Payer: COMMERCIAL

## 2024-12-18 PROCEDURE — 99213 OFFICE O/P EST LOW 20 MIN: CPT | Mod: 25

## 2024-12-18 PROCEDURE — 17000 DESTRUCT PREMALG LESION: CPT

## 2025-01-14 ENCOUNTER — NON-APPOINTMENT (OUTPATIENT)
Age: 60
End: 2025-01-14

## 2025-01-14 ENCOUNTER — APPOINTMENT (OUTPATIENT)
Dept: SURGICAL ONCOLOGY | Facility: CLINIC | Age: 60
End: 2025-01-14
Payer: COMMERCIAL

## 2025-01-14 VITALS
TEMPERATURE: 97.3 F | OXYGEN SATURATION: 98 % | HEART RATE: 74 BPM | DIASTOLIC BLOOD PRESSURE: 77 MMHG | WEIGHT: 170.08 LBS | SYSTOLIC BLOOD PRESSURE: 150 MMHG | HEIGHT: 65 IN | BODY MASS INDEX: 28.34 KG/M2

## 2025-01-14 DIAGNOSIS — Z09 ENCOUNTER FOR FOLLOW-UP EXAMINATION AFTER COMPLETED TREATMENT FOR CONDITIONS OTHER THAN MALIGNANT NEOPLASM: ICD-10-CM

## 2025-01-14 PROCEDURE — 99212 OFFICE O/P EST SF 10 MIN: CPT

## 2025-09-17 ENCOUNTER — APPOINTMENT (OUTPATIENT)
Dept: DERMATOLOGY | Facility: CLINIC | Age: 60
End: 2025-09-17
Payer: COMMERCIAL

## 2025-09-17 PROCEDURE — 99213 OFFICE O/P EST LOW 20 MIN: CPT
